# Patient Record
Sex: MALE | Race: OTHER
[De-identification: names, ages, dates, MRNs, and addresses within clinical notes are randomized per-mention and may not be internally consistent; named-entity substitution may affect disease eponyms.]

---

## 2019-05-15 ENCOUNTER — APPOINTMENT (OUTPATIENT)
Dept: ORTHOPEDIC SURGERY | Facility: CLINIC | Age: 49
End: 2019-05-15
Payer: COMMERCIAL

## 2019-05-15 VITALS — WEIGHT: 210 LBS | HEIGHT: 72 IN | RESPIRATION RATE: 16 BRPM | BODY MASS INDEX: 28.44 KG/M2

## 2019-05-15 DIAGNOSIS — Z87.39 PERSONAL HISTORY OF OTHER DISEASES OF THE MUSCULOSKELETAL SYSTEM AND CONNECTIVE TISSUE: ICD-10-CM

## 2019-05-15 DIAGNOSIS — G56.02 CARPAL TUNNEL SYNDROME, LEFT UPPER LIMB: ICD-10-CM

## 2019-05-15 DIAGNOSIS — K92.1 MELENA: ICD-10-CM

## 2019-05-15 DIAGNOSIS — Z78.9 OTHER SPECIFIED HEALTH STATUS: ICD-10-CM

## 2019-05-15 DIAGNOSIS — Z00.00 ENCOUNTER FOR GENERAL ADULT MEDICAL EXAMINATION W/OUT ABNORMAL FINDINGS: ICD-10-CM

## 2019-05-15 DIAGNOSIS — G56.01 CARPAL TUNNEL SYNDROME, RIGHT UPPER LIMB: ICD-10-CM

## 2019-05-15 PROCEDURE — 99203 OFFICE O/P NEW LOW 30 MIN: CPT

## 2019-05-15 PROCEDURE — 73110 X-RAY EXAM OF WRIST: CPT | Mod: 50

## 2019-05-15 RX ORDER — UBIQUINOL 100 MG
CAPSULE ORAL
Refills: 0 | Status: ACTIVE | COMMUNITY

## 2019-05-15 RX ORDER — PSYLLIUM HUSK 0.4 G
CAPSULE ORAL
Refills: 0 | Status: ACTIVE | COMMUNITY

## 2019-05-15 NOTE — HISTORY OF PRESENT ILLNESS
[FreeTextEntry1] : The patient is a 49-year-old right-hand-dominant male complaining of 5 year history of hand numbness. Also complains of bilateral dorsal forearm pain right greater than left that has been present for 10 years. Patient had a EMG by Dr. 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 Also had rheumatologic workup precursory exam by internist which was negative

## 2019-05-15 NOTE — ASSESSMENT
[FreeTextEntry1] : The patient has moderate carpal tunnel syndrome that is improving with carpal tunnel braces. He is going to continue with carpal tunnel bracing as well as activity modification. Symptoms worsen he will return for carpal tunnel injection

## 2019-05-15 NOTE — PHYSICAL EXAM
[de-identified] : Physical exam demonstrates the patient to be alert and oriented x 3 and capable of ambulation. The patient is well-developed and well-nourished in no apparent respiratory distress. The majority of the skin is intact bilaterally in the upper extremities without any bilateral elbow lymphadenopathy. \par \par \par \par Negative bilateral carpal tunnel compression is negative Tinel's over both carpal tunnels.  Negative Phalen's test bilaterally.  No palpable trigger digits. Full strength of abductor pollicis brevis bilaterally. Negative Tinel's of the cubital tunnel bilaterally. Negative elbow flexion test. Full intrinsic strength there\par \par Nontender over lateral epicondyle but has pain along mobile wad bilaterally. Full elbow range of motion. No intrinsic extrinsic atrophy\par \par The wrists have symmetric range of motion bilaterally. There is no tenderness over the scaphoid, scapholunate, or lunotriquetral ligaments bilaterally. There is a negative Blum's test bilaterally. There is no tenderness over the distal radial ulnar joint or TFCC and no evidence of instability bilaterally. There is tenderness over the pisotriquetral joint, hamate hook, or CMC joints bilaterally. There is 5 5 strength of the wrists bilaterally. The patient is nontender over both scaphoids and anatomic snuffbox is bilaterally. There is no clubbing cyanosis or edema.\par \par \par  [de-identified] : PA lateral oblique x-rays do not show any acute abnormality. Small lucent lesion right scaphoid with sclerotic borders

## 2023-06-07 ENCOUNTER — APPOINTMENT (OUTPATIENT)
Dept: COLORECTAL SURGERY | Facility: CLINIC | Age: 53
End: 2023-06-07

## 2025-06-11 ENCOUNTER — APPOINTMENT (OUTPATIENT)
Dept: OPHTHALMOLOGY | Facility: CLINIC | Age: 55
End: 2025-06-11

## 2025-06-11 PROCEDURE — 92002 INTRM OPH EXAM NEW PATIENT: CPT
